# Patient Record
Sex: FEMALE | Race: BLACK OR AFRICAN AMERICAN | NOT HISPANIC OR LATINO
[De-identification: names, ages, dates, MRNs, and addresses within clinical notes are randomized per-mention and may not be internally consistent; named-entity substitution may affect disease eponyms.]

---

## 2020-10-28 PROBLEM — Z00.00 ENCOUNTER FOR PREVENTIVE HEALTH EXAMINATION: Status: ACTIVE | Noted: 2020-10-28

## 2020-11-05 ENCOUNTER — APPOINTMENT (OUTPATIENT)
Dept: ENDOCRINOLOGY | Facility: CLINIC | Age: 65
End: 2020-11-05
Payer: MEDICARE

## 2020-11-05 VITALS — WEIGHT: 164 LBS | HEIGHT: 64 IN | BODY MASS INDEX: 28 KG/M2

## 2020-11-05 PROCEDURE — 99072 ADDL SUPL MATRL&STAF TM PHE: CPT

## 2020-11-05 PROCEDURE — 97802 MEDICAL NUTRITION INDIV IN: CPT

## 2020-12-08 ENCOUNTER — APPOINTMENT (OUTPATIENT)
Dept: ENDOCRINOLOGY | Facility: CLINIC | Age: 65
End: 2020-12-08
Payer: MEDICARE

## 2020-12-08 VITALS
DIASTOLIC BLOOD PRESSURE: 90 MMHG | SYSTOLIC BLOOD PRESSURE: 180 MMHG | BODY MASS INDEX: 27.46 KG/M2 | WEIGHT: 160 LBS | HEART RATE: 93 BPM

## 2020-12-08 DIAGNOSIS — G45.9 TRANSIENT CEREBRAL ISCHEMIC ATTACK, UNSPECIFIED: ICD-10-CM

## 2020-12-08 DIAGNOSIS — Z82.49 FAMILY HISTORY OF ISCHEMIC HEART DISEASE AND OTHER DISEASES OF THE CIRCULATORY SYSTEM: ICD-10-CM

## 2020-12-08 LAB — GLUCOSE BLDC GLUCOMTR-MCNC: 78

## 2020-12-08 PROCEDURE — 82962 GLUCOSE BLOOD TEST: CPT

## 2020-12-08 PROCEDURE — 99072 ADDL SUPL MATRL&STAF TM PHE: CPT

## 2020-12-08 PROCEDURE — 99204 OFFICE O/P NEW MOD 45 MIN: CPT | Mod: 25

## 2020-12-08 RX ORDER — LOSARTAN POTASSIUM 100 MG/1
100 TABLET, FILM COATED ORAL
Refills: 0 | Status: ACTIVE | COMMUNITY

## 2020-12-08 RX ORDER — PNV NO.95/FERROUS FUM/FOLIC AC 28MG-0.8MG
TABLET ORAL
Refills: 0 | Status: ACTIVE | COMMUNITY

## 2020-12-08 RX ORDER — HYDROCHLOROTHIAZIDE 25 MG/1
25 TABLET ORAL
Refills: 0 | Status: ACTIVE | COMMUNITY

## 2020-12-08 RX ORDER — AMLODIPINE BESYLATE 5 MG/1
5 TABLET ORAL
Refills: 0 | Status: ACTIVE | COMMUNITY

## 2020-12-08 RX ORDER — PRAVASTATIN SODIUM 20 MG/1
20 TABLET ORAL
Refills: 0 | Status: ACTIVE | COMMUNITY

## 2020-12-08 RX ORDER — GLIPIZIDE 5 MG/1
5 TABLET ORAL
Refills: 0 | Status: ACTIVE | COMMUNITY

## 2020-12-08 RX ORDER — CHROMIUM 200 MCG
TABLET ORAL
Refills: 0 | Status: ACTIVE | COMMUNITY

## 2020-12-08 NOTE — HISTORY OF PRESENT ILLNESS
[FreeTextEntry1] : Ms. Wall is a 65 year-old woman with a history of type 2 diabetes mellitus, hypertension, hyperlipidemia, transient ischemic attack presenting to establish care with me. \par \par Type 2 diabetes mellitus. HbA1c 6.5% in October 2020 and blood glucose 78 mg/dL today. History of transient ischemic attack in 2011. \par She was diagnosed with diabetes around 2004. No hospitalizations for hypo- or hyperglycemia.\par She is currently taking metformin 500 mg three times daily, glipizide 5 mg twice daily. She was on another medication in the past that caused diarrhea. \par She is checking blood sugars a few times per week; review of her glucometer demonstrates fasting and postprandial values all at goal. No recent hypoglycemia.\par She is on a blood pressure regimen\par She is on a statin for cholesterol\par Nephropathy screening: Treated with an angiotenin receptor blocker\par Last ophthalmology appointment: December 2020\par Last podiatry appointment: August 2020; upcoming appointment in February\par Last dental appointment: June 2020\par She is up-to-date with influenza and pneumonia vaccines\par \par She has had occasional bilateral hand tremors; discussed with her primary care provider. No chest pain, shortness of breath, polyuria/polydipsia, lower extremity numbness/tingling.

## 2020-12-08 NOTE — PHYSICAL EXAM
[Alert] : alert [Healthy Appearance] : healthy appearance [No Acute Distress] : no acute distress [Normal Sclera/Conjunctiva] : normal sclera/conjunctiva [No Neck Mass] : no neck mass was observed [No LAD] : no lymphadenopathy [Supple] : the neck was supple [No Respiratory Distress] : no respiratory distress [Clear to Auscultation] : lungs were clear to auscultation bilaterally [Normal S1, S2] : normal S1 and S2 [Normal Rate] : heart rate was normal [Regular Rhythm] : with a regular rhythm [No Stigmata of Cushings Syndrome] : no stigmata of Cushings Syndrome [Normal Gait] : normal gait [Right Foot Was Examined] : right foot ~C was examined [Left Foot Was Examined] : left foot ~C was examined [Normal] : normal [2+] : 2+ in the dorsalis pedis [Normal Insight/Judgement] : insight and judgment were intact [Kyphosis] : no kyphosis present [Acanthosis Nigricans] : no acanthosis nigricans [Diminished Throughout Both Feet] : normal tactile sensation with monofilament testing throughout both feet [de-identified] : no moon facies, no supraclavicular fat pads

## 2020-12-08 NOTE — ASSESSMENT
[FreeTextEntry1] : Type 2 diabetes mellitus. HbA1c 6.5% in October 2020 and blood glucose 78 mg/dL today. History of transient ischemic attack in 2011. We discussed the cardiovascular and microvascular complications of uncontrolled diabetes. We discussed the importance of diet and exercise and lifestyle modification for glycemic control. We discussed follow-up with nutrition. We discussed pharmacologic options for glycemic control. We discussed issues related to the recall of metformin ER; she has not been affected since she is taking immediate release metformin. She may be amenable to a trial of a GLP-1 receptor agonist next visit.\par Continue metformin 500 mg three times daily\par Continue glipizide 5 mg twice daily\par Continue to check blood sugars a few times per week; reviewed glycemic goals\par She is on a blood pressure regimen; blood pressure elevated today in the setting of stress and will monitor\par She is on a statin for cholesterol; last lipid panel around goal\par Nephropathy screening: Treated with an angiotenin receptor blocker\par Last ophthalmology appointment: December 2020\par Last podiatry appointment: August 2020; upcoming appointment in February\par Last dental appointment: June 2020\par She is up-to-date with influenza and pneumonia vaccines\par \par Return to see me in 3 months. Patient advised to call earlier with significant hypo- or hyperglycemia. \par \par CC:\par Dr. Jesse Seaman, Fax [phone]

## 2020-12-08 NOTE — DATA REVIEWED
[FreeTextEntry1] : Laboratories (October 9, 2020) reviewed and significant for: \par Unremarkable complete blood count\par Unremarkable comprehensive metabolic panel\par HbA1c 6.5%\par LDL 60 mg/dL\par HDL 33 mg/dL\par Total cholesterol 108 mg/dL\par Triglycerides 73 mg/dL

## 2021-03-11 ENCOUNTER — APPOINTMENT (OUTPATIENT)
Dept: ENDOCRINOLOGY | Facility: CLINIC | Age: 66
End: 2021-03-11
Payer: MEDICARE

## 2021-03-11 VITALS
SYSTOLIC BLOOD PRESSURE: 125 MMHG | HEART RATE: 84 BPM | BODY MASS INDEX: 28.32 KG/M2 | WEIGHT: 165 LBS | DIASTOLIC BLOOD PRESSURE: 83 MMHG

## 2021-03-11 DIAGNOSIS — E78.5 TYPE 2 DIABETES MELLITUS WITH OTHER SPECIFIED COMPLICATION: ICD-10-CM

## 2021-03-11 DIAGNOSIS — E11.69 TYPE 2 DIABETES MELLITUS WITH OTHER SPECIFIED COMPLICATION: ICD-10-CM

## 2021-03-11 DIAGNOSIS — I10 ESSENTIAL (PRIMARY) HYPERTENSION: ICD-10-CM

## 2021-03-11 DIAGNOSIS — E11.9 TYPE 2 DIABETES MELLITUS W/OUT COMPLICATIONS: ICD-10-CM

## 2021-03-11 LAB
GLUCOSE BLDC GLUCOMTR-MCNC: 72
HBA1C MFR BLD HPLC: 6.4

## 2021-03-11 PROCEDURE — 97802 MEDICAL NUTRITION INDIV IN: CPT

## 2021-03-11 PROCEDURE — 99072 ADDL SUPL MATRL&STAF TM PHE: CPT

## 2021-03-11 PROCEDURE — 82962 GLUCOSE BLOOD TEST: CPT

## 2021-03-11 PROCEDURE — 83036 HEMOGLOBIN GLYCOSYLATED A1C: CPT | Mod: QW

## 2021-03-11 PROCEDURE — 99214 OFFICE O/P EST MOD 30 MIN: CPT | Mod: 25

## 2021-03-11 RX ORDER — DULAGLUTIDE 0.75 MG/.5ML
0.75 INJECTION, SOLUTION SUBCUTANEOUS
Qty: 2 | Refills: 0 | Status: COMPLETED | OUTPATIENT
Start: 2021-03-11 | End: 2021-03-25

## 2021-03-11 RX ORDER — METFORMIN HYDROCHLORIDE 500 MG/1
500 TABLET, COATED ORAL
Refills: 0 | Status: DISCONTINUED | COMMUNITY
End: 2021-03-11

## 2021-03-11 RX ORDER — METFORMIN ER 750 MG 750 MG/1
750 TABLET ORAL
Qty: 180 | Refills: 3 | Status: ACTIVE | COMMUNITY
Start: 2021-03-11 | End: 1900-01-01

## 2021-03-11 RX ORDER — DULAGLUTIDE 1.5 MG/.5ML
1.5 INJECTION, SOLUTION SUBCUTANEOUS
Qty: 3 | Refills: 1 | Status: ACTIVE | COMMUNITY
Start: 2021-03-11 | End: 1900-01-01

## 2021-03-24 NOTE — ASSESSMENT
[FreeTextEntry1] : Type 2 diabetes mellitus. Point-of-care HbA1c 6.4% and blood glucose 72 mg/dL today; HbA1c 6.5% in October 2020. History of transient ischemic attack in 2011. We discussed the cardiovascular and microvascular complications of uncontrolled diabetes. We discussed the importance of diet and exercise and lifestyle modification for glycemic control. We discussed follow-up with nutrition. We discussed pharmacologic options for glycemic control. She is amenable to a trial of extended release metformin to simplify her regimen. She is amenable to a trial of a GLP-1 receptor agonist with cardiovascular benefit. We discussed the risks and benefits of the GLP-1 receptor agonist class, including but not limited to nausea, pancreatitis, medullary thyroid cancer. We reviewed subcutaneous injection technique, storage, and sharps disposal. She successfully administered a dose of Trulicity in the office.\par Adjust metformin to ER 1500 mg daily\par Start Trulicity 0.75 mg weekly for one month (samples given), then 1.5 mg weekly if covered by insurance\par Decrease glipizide to 5 mg daily for one month, then off\par Continue to check blood sugars a few times per week; reviewed glycemic goals\par She is on a blood pressure regimen; blood pressure around goal\par She is on a statin for cholesterol; last lipid panel around goal\par Nephropathy screening: Treated with an angiotenin receptor blocker\par Last ophthalmology appointment: December 2020\par Last podiatry appointment: February 2021\par Last dental appointment: June 2020; advised appointment when appropriate\par She is up-to-date with influenza and pneumonia vaccines\par \par Return to see me in 3 months. Patient advised to call earlier with significant hypo- or hyperglycemia. \par \par CC:\par Dr. Jesse Seaman, Fax [phone]

## 2021-03-24 NOTE — PHYSICAL EXAM
[Alert] : alert [Healthy Appearance] : healthy appearance [No Acute Distress] : no acute distress [Normal Sclera/Conjunctiva] : normal sclera/conjunctiva [Normal Hearing] : hearing was normal [No Stigmata of Cushings Syndrome] : no stigmata of Cushings Syndrome [Normal Gait] : normal gait [Normal Insight/Judgement] : insight and judgment were intact [Kyphosis] : no kyphosis present [Acanthosis Nigricans] : no acanthosis nigricans [de-identified] : no moon facies, no supraclavicular fat pads [de-identified] : Foot examination performed in December 2020

## 2021-03-24 NOTE — ADDENDUM
[FreeTextEntry1] : Ms. Jackson called due to fatigue, nausea, heartburn, bloating after taking Trulicity. She will try another dose this week and keep me updated. 3/16/21\par \par Ms. Wall has stopped Trulicity. She is taking metformin 500 mg daily and glipizide 5 mg twice daily. 3/24/21

## 2021-03-24 NOTE — HISTORY OF PRESENT ILLNESS
[FreeTextEntry1] : Ms. Wall is a 65 year-old woman with a history of type 2 diabetes mellitus, hypertension, hyperlipidemia, transient ischemic attack presenting for follow-up. I saw her for an initial visit in December 2020.\par \par Type 2 diabetes mellitus. Point-of-care HbA1c 6.4% and blood glucose 72 mg/dL today; HbA1c 6.5% in October 2020. History of transient ischemic attack in 2011. \par She was diagnosed with diabetes around 2004. No hospitalizations for hypo- or hyperglycemia.\par She is currently taking metformin 500 mg three times daily and glipizide 5 mg twice daily. She was on another medication in the past that caused diarrhea but does not remember the name. \par She is checking blood sugars a few times per week; review of her glucometer demonstrates fasting and postprandial values all at goal. No recent hypoglycemia.\par She is on a blood pressure regimen\par She is on a statin for cholesterol\par Nephropathy screening: Treated with an angiotenin receptor blocker\par Last ophthalmology appointment: December 2020\par Last podiatry appointment: February 2021\par Last dental appointment: June 2020\par She is up-to-date with influenza and pneumonia vaccines\par \par Interim History \par Last visit we continued her diabetes regimen and discussed use of a GLP-1 receptor agonist.\par She has had two does of the COVID-19 vaccine. \par No chest pain, shortness of breath, polyuria/polydipsia, lower extremity numbness/tingling. \par Medical and surgical history, medications, allergies, social and family history reviewed and updated as needed.

## 2021-06-17 ENCOUNTER — APPOINTMENT (OUTPATIENT)
Dept: ENDOCRINOLOGY | Facility: CLINIC | Age: 66
End: 2021-06-17

## 2022-03-31 ENCOUNTER — APPOINTMENT (OUTPATIENT)
Dept: OTOLARYNGOLOGY | Facility: CLINIC | Age: 67
End: 2022-03-31
Payer: MEDICARE

## 2022-03-31 VITALS
BODY MASS INDEX: 30.3 KG/M2 | SYSTOLIC BLOOD PRESSURE: 152 MMHG | WEIGHT: 171 LBS | DIASTOLIC BLOOD PRESSURE: 93 MMHG | HEIGHT: 63 IN | HEART RATE: 93 BPM | TEMPERATURE: 97 F

## 2022-03-31 DIAGNOSIS — Z86.79 PERSONAL HISTORY OF OTHER DISEASES OF THE CIRCULATORY SYSTEM: ICD-10-CM

## 2022-03-31 DIAGNOSIS — H92.20 OTORRHAGIA, UNSPECIFIED EAR: ICD-10-CM

## 2022-03-31 PROCEDURE — 69210 REMOVE IMPACTED EAR WAX UNI: CPT

## 2022-03-31 PROCEDURE — 99203 OFFICE O/P NEW LOW 30 MIN: CPT | Mod: 25

## 2022-03-31 NOTE — ASSESSMENT
[FreeTextEntry1] : 66F referred by PCP who noted cerumen impaction. She c/o decreased hearing. There is no otorrhea, otalgia, tinnitus or vertigo. On exam, both ears were completely occluded w thick cerumen, removed w curet and suction, with immediate improvement in hearing. The rest of the head and neck exam is unremarkable.\par Severe cerumen impaction --> RTO 4-6 months for ear exam and cleaning.\par

## 2022-03-31 NOTE — PHYSICAL EXAM
[FreeTextEntry1] : Au: EAC narrow. severe cerumen impaction, removed w suction and curet. TM intact and mobile, ME clear [Midline] : trachea located in midline position [Normal] : no rashes

## 2022-03-31 NOTE — CONSULT LETTER
[Dear  ___] : Dear  [unfilled], [Courtesy Letter:] : I had the pleasure of seeing your patient, [unfilled], in my office today. [Consult Closing:] : Thank you very much for allowing me to participate in the care of this patient.  If you have any questions, please do not hesitate to contact me. [Sincerely,] : Sincerely, [FreeTextEntry3] : Hardik Hwang MD\par Department of Otolaryngology - Head and Neck Surgery\par [facility]

## 2022-03-31 NOTE — HISTORY OF PRESENT ILLNESS
[de-identified] : 66F here for initial evaluation.\par \par Referred by PCP who noted cerumen impaction. She c/o decreased hearing. There is no otorrhea, otalgia, tinnitus or vertigo.\par \par ROS otherwise unremarkable.

## 2022-04-01 PROBLEM — H92.20 EAR BLEEDING: Status: ACTIVE | Noted: 2022-04-01

## 2022-04-01 RX ORDER — OFLOXACIN OTIC 3 MG/ML
0.3 SOLUTION AURICULAR (OTIC)
Qty: 1 | Refills: 0 | Status: ACTIVE | COMMUNITY
Start: 2022-04-01 | End: 1900-01-01

## 2022-04-08 ENCOUNTER — APPOINTMENT (OUTPATIENT)
Dept: OTOLARYNGOLOGY | Facility: CLINIC | Age: 67
End: 2022-04-08

## 2022-04-08 VITALS
WEIGHT: 171 LBS | BODY MASS INDEX: 30.3 KG/M2 | TEMPERATURE: 97.1 F | HEIGHT: 63 IN | DIASTOLIC BLOOD PRESSURE: 97 MMHG | HEART RATE: 90 BPM | SYSTOLIC BLOOD PRESSURE: 162 MMHG

## 2022-05-17 ENCOUNTER — APPOINTMENT (OUTPATIENT)
Dept: OTOLARYNGOLOGY | Facility: CLINIC | Age: 67
End: 2022-05-17
Payer: MEDICARE

## 2022-05-17 VITALS
SYSTOLIC BLOOD PRESSURE: 138 MMHG | WEIGHT: 171 LBS | HEIGHT: 63 IN | HEART RATE: 79 BPM | TEMPERATURE: 96.9 F | BODY MASS INDEX: 30.3 KG/M2 | DIASTOLIC BLOOD PRESSURE: 82 MMHG

## 2022-05-17 PROCEDURE — 69210 REMOVE IMPACTED EAR WAX UNI: CPT

## 2022-05-17 PROCEDURE — 99212 OFFICE O/P EST SF 10 MIN: CPT | Mod: 25

## 2022-05-17 NOTE — PHYSICAL EXAM
[Midline] : trachea located in midline position [Normal] : no rashes [FreeTextEntry1] : Ad: EAC w mild scab/cerumen removed w curet, TM intact and mobile, ME clear clear\par As: EAC w scab along floor some removed w alligator, most left in place. EAC widely patent, TM intact and mobile, ME clear

## 2022-05-17 NOTE — ASSESSMENT
[FreeTextEntry1] : 66F here in followup. She is doing well since last seen 6 weeks ago. Hearing has normalized. The scab fell our of her ears and she has no complaints. There is no otorrhea, otalgia, tinnitus or vertigo. On exam, some scab/cerumen removed were removed from either EAC which are patent with normal TMs; there is thin scab along the floor of the left EAC, which is widely patent. The rest of the head and neck exam is unremarkable.\par She is doing well, hearing is normal per pt. The left EAC scab should fall off. Keep ears dry. RTO 9 months for ear exam/cleaning.\par

## 2022-05-17 NOTE — CONSULT LETTER
[Dear  ___] : Dear  [unfilled], [Courtesy Letter:] : I had the pleasure of seeing your patient, [unfilled], in my office today. [Consult Closing:] : Thank you very much for allowing me to participate in the care of this patient.  If you have any questions, please do not hesitate to contact me. [Sincerely,] : Sincerely, [FreeTextEntry3] : Hardik Hwang MD\par Department of Otolaryngology - Head and Neck Surgery\par [facility]

## 2022-05-17 NOTE — HISTORY OF PRESENT ILLNESS
[de-identified] : 66F here in followup.\par \par She is doing well since last seen 6 weeks ago. Hearing has normalized. The scab fell our of her ears and she has no complaints. There is no otorrhea, otalgia, tinnitus or vertigo.\par \par ROS otherwise unremarkable.

## 2022-07-12 ENCOUNTER — APPOINTMENT (OUTPATIENT)
Dept: OTOLARYNGOLOGY | Facility: CLINIC | Age: 67
End: 2022-07-12

## 2023-05-18 ENCOUNTER — APPOINTMENT (OUTPATIENT)
Dept: OTOLARYNGOLOGY | Facility: CLINIC | Age: 68
End: 2023-05-18

## 2023-11-02 ENCOUNTER — APPOINTMENT (OUTPATIENT)
Dept: OTOLARYNGOLOGY | Facility: CLINIC | Age: 68
End: 2023-11-02
Payer: MEDICARE

## 2023-11-02 VITALS
WEIGHT: 167 LBS | HEART RATE: 98 BPM | BODY MASS INDEX: 29.59 KG/M2 | DIASTOLIC BLOOD PRESSURE: 85 MMHG | SYSTOLIC BLOOD PRESSURE: 142 MMHG | HEIGHT: 63 IN

## 2023-11-02 DIAGNOSIS — H61.23 IMPACTED CERUMEN, BILATERAL: ICD-10-CM

## 2023-11-02 DIAGNOSIS — H91.93 UNSPECIFIED HEARING LOSS, BILATERAL: ICD-10-CM

## 2023-11-02 PROCEDURE — 99213 OFFICE O/P EST LOW 20 MIN: CPT | Mod: 25

## 2023-11-02 PROCEDURE — 69210 REMOVE IMPACTED EAR WAX UNI: CPT

## 2024-05-09 ENCOUNTER — APPOINTMENT (OUTPATIENT)
Dept: OTOLARYNGOLOGY | Facility: CLINIC | Age: 69
End: 2024-05-09